# Patient Record
Sex: MALE | Race: WHITE | NOT HISPANIC OR LATINO
[De-identification: names, ages, dates, MRNs, and addresses within clinical notes are randomized per-mention and may not be internally consistent; named-entity substitution may affect disease eponyms.]

---

## 2018-11-08 PROBLEM — Z00.00 ENCOUNTER FOR PREVENTIVE HEALTH EXAMINATION: Status: ACTIVE | Noted: 2018-11-08

## 2018-11-19 ENCOUNTER — APPOINTMENT (OUTPATIENT)
Dept: SURGERY | Facility: CLINIC | Age: 53
End: 2018-11-19

## 2019-05-30 ENCOUNTER — NON-APPOINTMENT (OUTPATIENT)
Age: 54
End: 2019-05-30

## 2019-05-30 ENCOUNTER — APPOINTMENT (OUTPATIENT)
Dept: CARDIOLOGY | Facility: CLINIC | Age: 54
End: 2019-05-30
Payer: MEDICAID

## 2019-05-30 VITALS — SYSTOLIC BLOOD PRESSURE: 150 MMHG | DIASTOLIC BLOOD PRESSURE: 80 MMHG

## 2019-05-30 VITALS — BODY MASS INDEX: 49.44 KG/M2 | OXYGEN SATURATION: 97 % | WEIGHT: 315 LBS | HEART RATE: 108 BPM | HEIGHT: 67 IN

## 2019-05-30 DIAGNOSIS — Z87.891 PERSONAL HISTORY OF NICOTINE DEPENDENCE: ICD-10-CM

## 2019-05-30 DIAGNOSIS — Z82.49 FAMILY HISTORY OF ISCHEMIC HEART DISEASE AND OTHER DISEASES OF THE CIRCULATORY SYSTEM: ICD-10-CM

## 2019-05-30 PROCEDURE — 93000 ELECTROCARDIOGRAM COMPLETE: CPT

## 2019-05-30 PROCEDURE — 99205 OFFICE O/P NEW HI 60 MIN: CPT

## 2019-05-31 RX ORDER — FUROSEMIDE 40 MG/1
40 TABLET ORAL TWICE DAILY
Qty: 180 | Refills: 1 | Status: ACTIVE | COMMUNITY
Start: 2019-05-31

## 2019-05-31 RX ORDER — ALBUTEROL SULFATE 2.5 MG/3ML
(2.5 MG/3ML) SOLUTION RESPIRATORY (INHALATION)
Refills: 0 | Status: ACTIVE | COMMUNITY
Start: 2019-05-31

## 2019-05-31 RX ORDER — PANTOPRAZOLE 40 MG/1
40 TABLET, DELAYED RELEASE ORAL DAILY
Refills: 0 | Status: ACTIVE | COMMUNITY
Start: 2019-05-31

## 2019-05-31 RX ORDER — ESCITALOPRAM OXALATE 10 MG/1
10 TABLET, FILM COATED ORAL DAILY
Refills: 0 | Status: ACTIVE | COMMUNITY
Start: 2019-05-31

## 2019-05-31 RX ORDER — TRAZODONE HYDROCHLORIDE 50 MG/1
50 TABLET ORAL
Refills: 0 | Status: ACTIVE | COMMUNITY
Start: 2019-05-31

## 2019-05-31 RX ORDER — CLOPIDOGREL 75 MG/1
75 TABLET, FILM COATED ORAL DAILY
Qty: 90 | Refills: 1 | Status: ACTIVE | COMMUNITY
Start: 2019-05-31

## 2019-05-31 RX ORDER — SIMVASTATIN 40 MG/1
40 TABLET, FILM COATED ORAL
Qty: 90 | Refills: 1 | Status: ACTIVE | COMMUNITY
Start: 2019-05-31

## 2019-05-31 RX ORDER — METFORMIN HYDROCHLORIDE 500 MG/1
500 TABLET, COATED ORAL TWICE DAILY
Qty: 180 | Refills: 0 | Status: ACTIVE | COMMUNITY
Start: 2019-05-31

## 2019-05-31 RX ORDER — DIAZEPAM 10 MG/1
10 TABLET ORAL
Refills: 0 | Status: ACTIVE | COMMUNITY
Start: 2019-05-31

## 2019-06-03 ENCOUNTER — APPOINTMENT (OUTPATIENT)
Dept: CARDIOLOGY | Facility: CLINIC | Age: 54
End: 2019-06-03
Payer: MEDICAID

## 2019-06-03 PROCEDURE — 93306 TTE W/DOPPLER COMPLETE: CPT

## 2019-06-11 NOTE — DISCUSSION/SUMMARY
[Procedure High Risk] : the procedure risk is high [Patient Intermediate Risk] : the patient is an intermediate risk [As per surgery] : as per surgery [Continue] : Continue medications as currently directed [FreeTextEntry1] : He is hemodynamically stable   However I would like to get copies of his bypass surgery, cath reports and reports of stenting.  He should have an echocardiogram and a stress test for reasons stated above. He has had CABGx4 and 3 ( or is it 5 or 7??) stents. Initially he told me that he had 7 stents and later changed to 3 saying that he was joking!!. I am not so sure. I have scheduled him for those tests\par \par \par He had been referred to Dr. Doe for bariatric procedure but he says that he is not getting it done at this time.  I explained to him the pros and cons and I feel that it would be a very good idea for him to have it for long-term benefits. He said that eventually he intends to get it done.

## 2019-06-11 NOTE — PHYSICAL EXAM
[Normal Appearance] : normal appearance [Well Groomed] : well groomed [No Deformities] : no deformities [General Appearance - In No Acute Distress] : no acute distress [Normal Conjunctiva] : the conjunctiva exhibited no abnormalities [Eyelids - No Xanthelasma] : the eyelids demonstrated no xanthelasmas [Normal Oral Mucosa] : normal oral mucosa [No Oral Pallor] : no oral pallor [No Oral Cyanosis] : no oral cyanosis [Normal Jugular Venous A Waves Present] : normal jugular venous A waves present [Normal Jugular Venous V Waves Present] : normal jugular venous V waves present [No Jugular Venous Harry A Waves] : no jugular venous harry A waves [Respiration, Rhythm And Depth] : normal respiratory rhythm and effort [Exaggerated Use Of Accessory Muscles For Inspiration] : no accessory muscle use [Auscultation Breath Sounds / Voice Sounds] : lungs were clear to auscultation bilaterally [Heart Rate And Rhythm] : heart rate and rhythm were normal [Heart Sounds] : normal S1 and S2 [Murmurs] : no murmurs present [Abdomen Soft] : soft [Abdomen Tenderness] : non-tender [Abdomen Mass (___ Cm)] : no abdominal mass palpated [Abnormal Walk] : normal gait [Gait - Sufficient For Exercise Testing] : the gait was sufficient for exercise testing [Nail Clubbing] : no clubbing of the fingernails [Cyanosis, Localized] : no localized cyanosis [Petechial Hemorrhages (___cm)] : no petechial hemorrhages [Skin Color & Pigmentation] : normal skin color and pigmentation [] : no rash [No Venous Stasis] : no venous stasis [Skin Lesions] : no skin lesions [No Skin Ulcers] : no skin ulcer [No Xanthoma] : no  xanthoma was observed [Oriented To Time, Place, And Person] : oriented to person, place, and time [Affect] : the affect was normal [Mood] : the mood was normal [No Anxiety] : not feeling anxious [FreeTextEntry1] : see above

## 2019-06-11 NOTE — ASSESSMENT
[FreeTextEntry1] : Hemodynamically he is stable.  There are no signs of fluid overload or symptoms of ACS.  He has multiple comorbid conditions. Before he undergoes elective procedures he should get cardiac work up including echo to evaluate LV function and size and a stress test to r/o new significant CAD. He has a strong h/o of premature atherosclerosis. He is going to need it anyway prior to bariatric surgery. There are almost three weeks to get these tests done before the scheduled date for colonoscopy.

## 2019-06-11 NOTE — HISTORY OF PRESENT ILLNESS
[Preoperative Visit] : for a medical evaluation prior to surgery [Scheduled Procedure ___] : a [unfilled] [Date of Surgery ___] : on [unfilled] [Surgeon Name ___] : surgeon: [unfilled] [Good] : Good [Dyspnea] : dyspnea [Abdominal Pain] : abdominal pain [Poor Exercise Tolerance] : poor exercise tolerance [Diabetes] : diabetes [Cardiovascular Disease] : cardiovascular disease [Anti-Platelet Agents] : anti-platelet agents [GI Disease] : gastrointestinal disease [Frequent Aspirin Use] : frequent aspirin use [Prior Anesthesia] : Prior anesthesia [Prev Anesthesia Reaction] : previous anesthesia reaction [Electrocardiogram] : ~T an ECG ~C was performed [Cardiac Catheterization  (Diagnostic)] : cardiac catheterization ~T ~C was performed [Cardiovascular Stress Test] : a cardiac stress test ~T ~C was performed [Fever] : no fever [Chills] : no chills [Fatigue] : no fatigue [Chest Pain] : no chest pain [Cough] : no cough [Dysuria] : no dysuria [Urinary Frequency] : no urinary frequency [Nausea] : no nausea [Vomiting] : no vomiting [Diarrhea] : no diarrhea [Easy Bruising] : no easy bruising [Lower Extremity Swelling] : no lower extremity swelling [Pulmonary Disease] : no pulmonary disease [Nicotine Dependence] : no nicotine dependence [Alcohol Use] : no  alcohol use [Renal Disease] : no renal disease [Sleep Apnea] : no sleep apnea [Thromboembolic Problems] : no thromboembolic problems [Frequent use of NSAIDs] : no use of NSAIDs [Transfusion Reaction] : no transfusion reaction [Impaired Immunity] : no impaired immunity [Steroid Use in Last 6 Months] : no steroid use in the last six months [Anesthesia Reaction] : no anesthesia reaction [Sudden Death] : no sudden death [Clotting Disorder] : no clotting disorder [Bleeding Disorder] : no bleeding disorder [FreeTextEntry1] : 54-year-old gentleman was referred for cardiac evaluation and clearance for colonoscopy.  He has been having some stomach discomfort and colonoscopy was recommended for further evaluation.\par \par It feels like cramps in the stomach.  There is no heartburn.  He has no history of any exertional chest pain, palpitation, shortness of breath or dizziness.\par \par He used to be very active before.  About 2 years ago he lost 190 pounds.  He used to go to the gym on a regular basis.  He developed some problem with his foot 2 years ago which required surgery.  Since then he has gained about 95 pounds.  He is not physically active now.He now weighs about 325 pounds.

## 2019-06-19 ENCOUNTER — APPOINTMENT (OUTPATIENT)
Dept: CARDIOLOGY | Facility: CLINIC | Age: 54
End: 2019-06-19
Payer: MEDICAID

## 2019-06-19 PROCEDURE — 78452 HT MUSCLE IMAGE SPECT MULT: CPT | Mod: 26

## 2019-06-19 PROCEDURE — 93018 CV STRESS TEST I&R ONLY: CPT

## 2019-06-19 PROCEDURE — 93016 CV STRESS TEST SUPVJ ONLY: CPT

## 2019-06-24 ENCOUNTER — MOBILE ON CALL (OUTPATIENT)
Age: 54
End: 2019-06-24

## 2019-06-26 ENCOUNTER — MOBILE ON CALL (OUTPATIENT)
Age: 54
End: 2019-06-26

## 2019-07-02 ENCOUNTER — APPOINTMENT (OUTPATIENT)
Dept: CARDIOLOGY | Facility: CLINIC | Age: 54
End: 2019-07-02

## 2019-07-09 ENCOUNTER — OUTPATIENT (OUTPATIENT)
Dept: OUTPATIENT SERVICES | Facility: HOSPITAL | Age: 54
LOS: 1 days | End: 2019-07-09
Payer: MEDICAID

## 2019-07-09 VITALS
WEIGHT: 315 LBS | TEMPERATURE: 100 F | HEART RATE: 89 BPM | HEIGHT: 67 IN | RESPIRATION RATE: 18 BRPM | SYSTOLIC BLOOD PRESSURE: 136 MMHG | DIASTOLIC BLOOD PRESSURE: 90 MMHG

## 2019-07-09 DIAGNOSIS — Z01.818 ENCOUNTER FOR OTHER PREPROCEDURAL EXAMINATION: ICD-10-CM

## 2019-07-09 DIAGNOSIS — Z95.1 PRESENCE OF AORTOCORONARY BYPASS GRAFT: Chronic | ICD-10-CM

## 2019-07-09 DIAGNOSIS — R94.39 ABNORMAL RESULT OF OTHER CARDIOVASCULAR FUNCTION STUDY: ICD-10-CM

## 2019-07-09 LAB
ANION GAP SERPL CALC-SCNC: 18 MMOL/L — HIGH (ref 5–17)
BUN SERPL-MCNC: 7 MG/DL — SIGNIFICANT CHANGE UP (ref 7–23)
CALCIUM SERPL-MCNC: 9.8 MG/DL — SIGNIFICANT CHANGE UP (ref 8.4–10.5)
CHLORIDE SERPL-SCNC: 103 MMOL/L — SIGNIFICANT CHANGE UP (ref 96–108)
CO2 SERPL-SCNC: 22 MMOL/L — SIGNIFICANT CHANGE UP (ref 22–31)
CREAT SERPL-MCNC: 1.09 MG/DL — SIGNIFICANT CHANGE UP (ref 0.5–1.3)
GLUCOSE SERPL-MCNC: 110 MG/DL — HIGH (ref 70–99)
HCT VFR BLD CALC: 44.5 % — SIGNIFICANT CHANGE UP (ref 39–50)
HGB BLD-MCNC: 15.6 G/DL — SIGNIFICANT CHANGE UP (ref 13–17)
MCHC RBC-ENTMCNC: 28.2 PG — SIGNIFICANT CHANGE UP (ref 27–34)
MCHC RBC-ENTMCNC: 35.1 GM/DL — SIGNIFICANT CHANGE UP (ref 32–36)
MCV RBC AUTO: 80.1 FL — SIGNIFICANT CHANGE UP (ref 80–100)
PLATELET # BLD AUTO: 168 K/UL — SIGNIFICANT CHANGE UP (ref 150–400)
POTASSIUM SERPL-MCNC: 3.1 MMOL/L — LOW (ref 3.5–5.3)
POTASSIUM SERPL-MCNC: 3.9 MMOL/L — SIGNIFICANT CHANGE UP (ref 3.5–5.3)
POTASSIUM SERPL-SCNC: 3.1 MMOL/L — LOW (ref 3.5–5.3)
POTASSIUM SERPL-SCNC: 3.9 MMOL/L — SIGNIFICANT CHANGE UP (ref 3.5–5.3)
RBC # BLD: 5.55 M/UL — SIGNIFICANT CHANGE UP (ref 4.2–5.8)
RBC # FLD: 18.6 % — HIGH (ref 10.3–14.5)
SODIUM SERPL-SCNC: 143 MMOL/L — SIGNIFICANT CHANGE UP (ref 135–145)
WBC # BLD: 8 K/UL — SIGNIFICANT CHANGE UP (ref 3.8–10.5)
WBC # FLD AUTO: 8 K/UL — SIGNIFICANT CHANGE UP (ref 3.8–10.5)

## 2019-07-09 PROCEDURE — 85027 COMPLETE CBC AUTOMATED: CPT

## 2019-07-09 PROCEDURE — 92928 PRQ TCAT PLMT NTRAC ST 1 LES: CPT | Mod: LD,GC

## 2019-07-09 PROCEDURE — 93567 NJX CAR CTH SPRVLV AORTGRPHY: CPT | Mod: GC

## 2019-07-09 PROCEDURE — 93567 NJX CAR CTH SPRVLV AORTGRPHY: CPT

## 2019-07-09 PROCEDURE — 93010 ELECTROCARDIOGRAM REPORT: CPT

## 2019-07-09 PROCEDURE — C1894: CPT

## 2019-07-09 PROCEDURE — 93455 CORONARY ART/GRFT ANGIO S&I: CPT | Mod: 26,59,GC

## 2019-07-09 PROCEDURE — C1874: CPT

## 2019-07-09 PROCEDURE — C9600: CPT | Mod: LD

## 2019-07-09 PROCEDURE — C1760: CPT

## 2019-07-09 PROCEDURE — C1887: CPT

## 2019-07-09 PROCEDURE — 93455 CORONARY ART/GRFT ANGIO S&I: CPT | Mod: 59

## 2019-07-09 PROCEDURE — 93005 ELECTROCARDIOGRAM TRACING: CPT

## 2019-07-09 PROCEDURE — 84132 ASSAY OF SERUM POTASSIUM: CPT

## 2019-07-09 PROCEDURE — 80048 BASIC METABOLIC PNL TOTAL CA: CPT

## 2019-07-09 PROCEDURE — C1769: CPT

## 2019-07-09 RX ORDER — TRAZODONE HCL 50 MG
1 TABLET ORAL
Qty: 0 | Refills: 0 | DISCHARGE

## 2019-07-09 RX ORDER — LUBIPROSTONE 24 UG/1
1 CAPSULE, GELATIN COATED ORAL
Qty: 0 | Refills: 0 | DISCHARGE

## 2019-07-09 RX ORDER — FUROSEMIDE 40 MG
1 TABLET ORAL
Qty: 0 | Refills: 0 | DISCHARGE

## 2019-07-09 RX ORDER — METFORMIN HYDROCHLORIDE 850 MG/1
1 TABLET ORAL
Qty: 0 | Refills: 0 | DISCHARGE

## 2019-07-09 RX ORDER — CLOPIDOGREL BISULFATE 75 MG/1
1 TABLET, FILM COATED ORAL
Qty: 0 | Refills: 0 | DISCHARGE

## 2019-07-09 RX ORDER — ESCITALOPRAM OXALATE 10 MG/1
1 TABLET, FILM COATED ORAL
Qty: 0 | Refills: 0 | DISCHARGE

## 2019-07-09 RX ORDER — PANTOPRAZOLE SODIUM 20 MG/1
1 TABLET, DELAYED RELEASE ORAL
Qty: 0 | Refills: 0 | DISCHARGE

## 2019-07-09 RX ORDER — DIAZEPAM 5 MG
1 TABLET ORAL
Qty: 0 | Refills: 0 | DISCHARGE

## 2019-07-09 RX ORDER — POTASSIUM BICARBONATE 978 MG/1
25 TABLET, EFFERVESCENT ORAL ONCE
Refills: 0 | Status: COMPLETED | OUTPATIENT
Start: 2019-07-09 | End: 2019-07-09

## 2019-07-09 RX ORDER — POTASSIUM CHLORIDE 20 MEQ
40 PACKET (EA) ORAL ONCE
Refills: 0 | Status: COMPLETED | OUTPATIENT
Start: 2019-07-09 | End: 2019-07-09

## 2019-07-09 RX ORDER — SIMVASTATIN 20 MG/1
1 TABLET, FILM COATED ORAL
Qty: 0 | Refills: 0 | DISCHARGE

## 2019-07-09 RX ORDER — ASPIRIN/CALCIUM CARB/MAGNESIUM 324 MG
1 TABLET ORAL
Qty: 0 | Refills: 0 | DISCHARGE

## 2019-07-09 RX ADMIN — POTASSIUM BICARBONATE 25 MILLIEQUIVALENT(S): 978 TABLET, EFFERVESCENT ORAL at 13:17

## 2019-07-09 RX ADMIN — Medication 40 MILLIEQUIVALENT(S): at 13:18

## 2019-07-09 NOTE — H&P CARDIOLOGY - PMH
CAD (coronary artery disease)    HLD (hyperlipidemia)    HTN (hypertension)    Type 2 diabetes mellitus

## 2019-07-09 NOTE — H&P CARDIOLOGY - CONSTITUTIONAL
In the next few weeks you may receive a [employer] survey regarding your most recent clinic visit with us.  Please take a few moments out of your day to accurately evaluate your visit.  We strive to provide you with the best medical care.  Again, thank you for your time and we look forward to your next visit.  Well-Child Checkup: 14 to 18 Years  During the teen years, it’s important to keep having yearly checkups. Your teen may be embarrassed about having a checkup. Reassure your teen that the exam is normal and necessary. Be aware that the health care provider may ask to talk with your child without you in the exam room.     Stay involved in your teen’s life. Make sure your teen knows you’re always there when he or she needs to talk.     School and social issues  Here are some topics you, your teen, and the health care provider may want to discuss during this visit:  · School performance. How is your child doing in school? Is homework finished on time? Does your child stay organized? These are skills you can help with. Keep in mind that a drop in school performance can be a sign of other problems.  · Friendships. Do you like your child’s friends? Do the friendships seem healthy? Make sure to talk to your teen about who his or her friends are and how they spend time together. Peer pressure can be a problem among teenagers.  · Life at home. How is your child’s behavior? Does he or she get along with others in the family? Is he or she respectful of you, other adults, and authority? Does your child participate in family events, or does he or she withdraw from other family members?  · Risky behaviors. Many teenagers are curious about drugs, alcohol, smoking, and sex. Talk openly about these issues. Answer your child’s questions, and don’t be afraid to ask questions of your own. If you’re not sure how to approach these topics, talk to the health care provider for advice.   Puberty  Your teen may still be experiencing  some of the changes of puberty, such as:  · Acne and body odor. Hormones that increase during puberty can cause acne (pimples) on the face and body. Hormones can also increase sweating and cause a stronger body odor.  · Body changes. The body grows and matures during puberty. Hair will grow in the pubic area and on other parts of the body. Girls grow breasts and menstruate (have monthly periods). A boy’s voice changes, becoming lower and deeper. As the penis matures, erections and wet dreams will start to happen. Talk to your teen about what to expect, and help him or her deal with these changes when possible.  · Emotional changes. Along with these physical changes, you’ll likely notice changes in your teen’s personality. He or she may develop an interest in dating and becoming “more than friends” with other kids. Also, it’s normal for your teen to be guzman. Try to be patient and consistent. Encourage conversations, even when he or she doesn’t seem to want to talk. No matter how your teen acts, he or she still needs a parent.  Nutrition and exercise tips  Your teenager likely makes his or her own decisions about what to eat and how to spend free time. You can’t always have the final say, but you can encourage healthy habits. Your teen should:  · Get at least 30 minutes to 60 minutes of physical activity every day. This time can be broken up throughout the day. After-school sports, dance or martial arts classes, riding a bike, or even walking to school or a friend’s house counts as activity.    · Limit “screen time” to 1 hour to 2 hours each day. This includes time spent watching TV, playing video games, using the computer, and texting. If your teen has a TV, computer, or video game console in the bedroom, consider replacing it with a music player.   · Eat healthy. Your child should eat fruits, vegetables, lean meats, and whole grains every day. Less healthy foods--like French fries, candy, and chips--should be eaten  rarely. Some teens fall into the trap of snacking on junk food and fast food throughout the day. Make sure the kitchen is stocked with healthy options for after-school snacks. If your teen does choose to eat junk food, consider making him or her buy it with his or her own money.   · Eat 3 meals a day. Many kids skip breakfast and even lunch. Not only is this unhealthy, it can also hurt school performance. Make sure your teen eats breakfast. If your teen does not like the food served at school for lunch, allow him or her to prepare a bag lunch.  · Have at least one family meal with you each day. Busy schedules often limit time for sitting and talking. Sitting and eating together allows for family time. It also lets you see what and how your child eats.   · Limit soda and juice drinks. A small soda is OK once in a while. But soda, sports drinks, and juice drinks are no substitute for healthier drinks. Sports and juice drinks are no better. Water and low-fat or nonfat milk are the best choices.  Hygiene tips  · Teenagers should bathe or shower daily and use deodorant.  · Let the health care provider know if you or your teen have questions about hygiene or acne.  · Bring your teen to the dentist at least twice a year for teeth cleaning and a checkup.  · Remind your teen to brush and floss his or her teeth before bed.  Sleeping tips  During the teen years, sleep patterns may change. Many teenagers have a hard time falling asleep, which can lead to sleeping late the next morning. Here are some tips to help your teen get the rest he or she needs:  · Encourage your teen to keep a consistent bedtime, even on weekends. Sleeping is easier when the body follows a routine. Don’t let your teen stay up too late at night or sleep in too long in the morning.  · Help your teen wake up, if needed. Go into the bedroom, open the blinds, and get your teen out of bed -- even on weekends or during school vacations.  · Being active during  the day will help your child sleep better at night.  · Discourage use of the TV, computer, or video games for at least an hour before your teen goes to bed. (This is good advice for parents, too!)  · Make a rule that cell phones must be turned off at night.  Safety tips  · Set rules for how your teen can spend time outside of the house. Give your child a nighttime curfew. If your child has a cell phone, check in periodically by calling to ask where he or she is and what he or she is doing.  · Make sure cell phones and portable music players are used safely and responsibly. Help your teen understand that it is dangerous to talk on the phone, text, or listen to music with headphones while he or she is riding a bike or walking outdoors, especially when crossing the street.  · Constant loud music can cause hearing damage, so monitor your teen’s music volume. Many music players let you set a limit for how loud the volume can be turned up. Check the directions for details.  · When your teen is old enough for a ’s license, encourage safe driving. Teach your teen to always wear a seat belt, drive the speed limit, and follow the rules of the road. Do not allow your teenager to text or talk on a cell phone while driving. (And don’t do this yourself! Remember, you set an example.)  · Set rules and limits around driving and use of the car. If your teen gets a ticket or has an accident, there should be consequences. Driving is a privilege that can be taken away if your child doesn’t follow the rules.  · Teach your child to make good decisions about drugs, alcohol, sex, and other risky behaviors. Work together to come up with strategies for staying safe and dealing with peer pressure. Make sure your teenager knows he or she can always come to you for help.  Tests and vaccinations  If you have a strong family history of high cholesterol, your teen’s blood cholesterol may be tested at this visit. Based on recommendations from  the [facility], at this visit your child may receive the following vaccinations:  · Meningococcal  · Influenza (flu), annually  Recognizing signs of depression  It’s normal for teenagers to have extreme mood swings as a result of their changing hormones. It’s also just a part of growing up. But sometimes a teenager’s mood swings are signs of a larger problem. If your teen seems depressed for more than 2 weeks, you should be concerned. Signs of depression include:  · Use of drugs or alcohol  · Problems in school and at home  · Frequent episodes of running away  · Thoughts or talk of death or suicide  · Withdrawal from family and friends  · Sudden changes in eating or sleeping habits  · Sexual promiscuity or unplanned pregnancy  · Hostile behavior or rage  · Loss of pleasure in life  Depressed teens can be helped with treatment. Talk to your child’s health care provider. Or check with your local mental health center, social service agency, or hospital. Assure your teen that his or her pain can be eased. Offer your love and support. If your teen talks about death or suicide, seek help right away.      Next checkup at: _______________________________     PARENT NOTES:        © 4227-3907 [employer] [address] [place] [address], [place] [address]. All rights reserved. This information is not intended as a substitute for professional medical care. Always follow your healthcare professional's instructions.         Well-developed, well nourished

## 2019-07-09 NOTE — H&P CARDIOLOGY - HISTORY OF PRESENT ILLNESS
54 year old morbidly obese  male with pmhx of CAD  prior PCI ,  CABG x 4 (2003 at [facility]), type 2 DM , no history of implantable devices and HLD presents for left heart catheterization. The patient presented to his cardiologist, Dr. Melendez for cardiac clearance for an upcoming colonscopy.  He underwent a nuclear stress test which revealed the following inferolateral oliver infarct ischemia.  Of note the patient does not want to stay overnight regardless if he is PCI or not. He currently denies chest pain, syncope or dizziness.

## 2019-07-12 PROBLEM — I25.10 ATHEROSCLEROTIC HEART DISEASE OF NATIVE CORONARY ARTERY WITHOUT ANGINA PECTORIS: Chronic | Status: ACTIVE | Noted: 2019-07-09

## 2019-07-12 PROBLEM — E78.5 HYPERLIPIDEMIA, UNSPECIFIED: Chronic | Status: ACTIVE | Noted: 2019-07-09

## 2019-07-12 PROBLEM — I10 ESSENTIAL (PRIMARY) HYPERTENSION: Chronic | Status: ACTIVE | Noted: 2019-07-09

## 2019-07-12 PROBLEM — E11.9 TYPE 2 DIABETES MELLITUS WITHOUT COMPLICATIONS: Chronic | Status: ACTIVE | Noted: 2019-07-09

## 2019-07-17 ENCOUNTER — APPOINTMENT (OUTPATIENT)
Dept: CARDIOLOGY | Facility: CLINIC | Age: 54
End: 2019-07-17
Payer: MEDICAID

## 2019-07-17 ENCOUNTER — NON-APPOINTMENT (OUTPATIENT)
Age: 54
End: 2019-07-17

## 2019-07-17 VITALS — SYSTOLIC BLOOD PRESSURE: 130 MMHG | DIASTOLIC BLOOD PRESSURE: 80 MMHG

## 2019-07-17 DIAGNOSIS — Z01.818 ENCOUNTER FOR OTHER PREPROCEDURAL EXAMINATION: ICD-10-CM

## 2019-07-17 DIAGNOSIS — R94.39 ABNORMAL RESULT OF OTHER CARDIOVASCULAR FUNCTION STUDY: ICD-10-CM

## 2019-07-17 PROCEDURE — 93000 ELECTROCARDIOGRAM COMPLETE: CPT | Mod: 59

## 2019-07-17 PROCEDURE — 99496 TRANSJ CARE MGMT HIGH F2F 7D: CPT

## 2019-07-17 RX ORDER — CARVEDILOL 6.25 MG/1
6.25 TABLET, FILM COATED ORAL TWICE DAILY
Qty: 180 | Refills: 1 | Status: ACTIVE | COMMUNITY
Start: 2019-07-17 | End: 1900-01-01

## 2019-07-17 NOTE — DISCUSSION/SUMMARY
[FreeTextEntry1] : As he is doing well, I did not make any changes in his medications.  I told him to defer colonoscopy for the time being.Because of family dysfunction as well as VPCs I started him on carvedilol 6.25 mg twice a day.

## 2019-07-17 NOTE — ASSESSMENT
[FreeTextEntry1] : He is doing very well after his PCI.  Based on his symptoms it looks like there may already be some improvement of left ventricle systolic function.  I will repeat echo at the end of 3 months to assess.

## 2019-07-17 NOTE — HISTORY OF PRESENT ILLNESS
[FreeTextEntry1] : He feels much better after the PCI.  Yesterday he was able to walk 2 miles on a treadmill without any shortness of breath.  He has lost 10 pounds of weight since PCI.  Since January he has lost a total of 68 pounds.\par \par There is no chest pain.

## 2019-07-17 NOTE — REASON FOR VISIT
[Follow-Up - Clinic] : a clinic follow-up of [Coronary Artery Disease] : coronary artery disease [CABG Follow-up] : bypass graft [Prior Myocardial Infarction] : a prior myocardial infarction

## 2019-08-29 ENCOUNTER — APPOINTMENT (OUTPATIENT)
Dept: CARDIOLOGY | Facility: CLINIC | Age: 54
End: 2019-08-29
Payer: MEDICAID

## 2019-08-29 VITALS — SYSTOLIC BLOOD PRESSURE: 134 MMHG | DIASTOLIC BLOOD PRESSURE: 90 MMHG

## 2019-08-29 DIAGNOSIS — Z95.5 PRESENCE OF CORONARY ANGIOPLASTY IMPLANT AND GRAFT: ICD-10-CM

## 2019-08-29 PROCEDURE — 99214 OFFICE O/P EST MOD 30 MIN: CPT

## 2019-08-29 RX ORDER — LISINOPRIL 10 MG/1
10 TABLET ORAL DAILY
Qty: 90 | Refills: 1 | Status: ACTIVE | COMMUNITY
Start: 2019-08-29 | End: 1900-01-01

## 2019-08-29 NOTE — DISCUSSION/SUMMARY
[FreeTextEntry1] : I added lisinopril 10 mg po daily. F/U BP in 1 month. Defer endoscopy till Jan if possible. He hAS NOT taken furosemide for a while. I told him to start taking it regularly. It will help with his breathing as well.

## 2019-08-29 NOTE — PHYSICAL EXAM
[Well Groomed] : well groomed [Normal Appearance] : normal appearance [No Deformities] : no deformities [General Appearance - In No Acute Distress] : no acute distress [Eyelids - No Xanthelasma] : the eyelids demonstrated no xanthelasmas [Normal Conjunctiva] : the conjunctiva exhibited no abnormalities [Normal Oral Mucosa] : normal oral mucosa [No Oral Cyanosis] : no oral cyanosis [No Oral Pallor] : no oral pallor [Normal Jugular Venous A Waves Present] : normal jugular venous A waves present [Normal Jugular Venous V Waves Present] : normal jugular venous V waves present [No Jugular Venous Harry A Waves] : no jugular venous harry A waves [Respiration, Rhythm And Depth] : normal respiratory rhythm and effort [Exaggerated Use Of Accessory Muscles For Inspiration] : no accessory muscle use [Auscultation Breath Sounds / Voice Sounds] : lungs were clear to auscultation bilaterally [Heart Rate And Rhythm] : heart rate and rhythm were normal [Heart Sounds] : normal S1 and S2 [Murmurs] : no murmurs present [Abdomen Soft] : soft [Abdomen Tenderness] : non-tender [Abdomen Mass (___ Cm)] : no abdominal mass palpated [Abnormal Walk] : normal gait [Gait - Sufficient For Exercise Testing] : the gait was sufficient for exercise testing [Nail Clubbing] : no clubbing of the fingernails [Cyanosis, Localized] : no localized cyanosis [Petechial Hemorrhages (___cm)] : no petechial hemorrhages [Skin Color & Pigmentation] : normal skin color and pigmentation [] : no rash [No Venous Stasis] : no venous stasis [Skin Lesions] : no skin lesions [No Skin Ulcers] : no skin ulcer [No Xanthoma] : no  xanthoma was observed [FreeTextEntry1] : see above [Oriented To Time, Place, And Person] : oriented to person, place, and time [Affect] : the affect was normal [Mood] : the mood was normal [No Anxiety] : not feeling anxious

## 2019-08-29 NOTE — REASON FOR VISIT
[FreeTextEntry1] : There is no chest pain. Has abd discomfort and breathing pain in the stomach from reflux etc.

## 2019-10-17 ENCOUNTER — NON-APPOINTMENT (OUTPATIENT)
Age: 54
End: 2019-10-17

## 2019-10-17 ENCOUNTER — APPOINTMENT (OUTPATIENT)
Dept: CARDIOLOGY | Facility: CLINIC | Age: 54
End: 2019-10-17
Payer: MEDICAID

## 2019-10-17 VITALS — BODY MASS INDEX: 50.12 KG/M2 | OXYGEN SATURATION: 91 % | WEIGHT: 315 LBS | HEART RATE: 92 BPM

## 2019-10-17 VITALS — DIASTOLIC BLOOD PRESSURE: 80 MMHG | SYSTOLIC BLOOD PRESSURE: 146 MMHG

## 2019-10-17 DIAGNOSIS — E66.01 MORBID (SEVERE) OBESITY DUE TO EXCESS CALORIES: ICD-10-CM

## 2019-10-17 DIAGNOSIS — I51.9 HEART DISEASE, UNSPECIFIED: ICD-10-CM

## 2019-10-17 DIAGNOSIS — R93.1 ABNORMAL FINDINGS ON DIAGNOSTIC IMAGING OF HEART AND CORONARY CIRCULATION: ICD-10-CM

## 2019-10-17 DIAGNOSIS — Z95.1 PRESENCE OF AORTOCORONARY BYPASS GRAFT: ICD-10-CM

## 2019-10-17 DIAGNOSIS — Z95.5 PRESENCE OF CORONARY ANGIOPLASTY IMPLANT AND GRAFT: ICD-10-CM

## 2019-10-17 DIAGNOSIS — I25.2 OLD MYOCARDIAL INFARCTION: ICD-10-CM

## 2019-10-17 PROCEDURE — 93000 ELECTROCARDIOGRAM COMPLETE: CPT

## 2019-10-17 PROCEDURE — 99214 OFFICE O/P EST MOD 30 MIN: CPT

## 2019-10-17 NOTE — HISTORY OF PRESENT ILLNESS
[FreeTextEntry1] : He denies any chest pain palpitation or dizziness.  He gets short of breath after walking one block.  He denies orthopnea.It has been over 3 months since he had LAD stent.  It is time to look for recovery of LV function.

## 2019-10-17 NOTE — DISCUSSION/SUMMARY
[FreeTextEntry1] : I did not make any changes in his medications.  I told him to watch his salt and try to lose weight.  Echocardiogram will be done to evaluate recovery of LV function.  If his blood pressure continues to be in the present range during his next visit then I will increase his medications.  He is very anxious and nervous at this time.

## 2019-10-17 NOTE — PHYSICAL EXAM
[Normal Appearance] : normal appearance [No Deformities] : no deformities [Well Groomed] : well groomed [General Appearance - In No Acute Distress] : no acute distress [Normal Conjunctiva] : the conjunctiva exhibited no abnormalities [Normal Oral Mucosa] : normal oral mucosa [No Oral Pallor] : no oral pallor [Eyelids - No Xanthelasma] : the eyelids demonstrated no xanthelasmas [Normal Jugular Venous A Waves Present] : normal jugular venous A waves present [Normal Jugular Venous V Waves Present] : normal jugular venous V waves present [No Oral Cyanosis] : no oral cyanosis [No Jugular Venous Harry A Waves] : no jugular venous harry A waves [Respiration, Rhythm And Depth] : normal respiratory rhythm and effort [Exaggerated Use Of Accessory Muscles For Inspiration] : no accessory muscle use [Heart Sounds] : normal S1 and S2 [Auscultation Breath Sounds / Voice Sounds] : lungs were clear to auscultation bilaterally [Heart Rate And Rhythm] : heart rate and rhythm were normal [Murmurs] : no murmurs present [Abdomen Tenderness] : non-tender [Abdomen Soft] : soft [Abdomen Mass (___ Cm)] : no abdominal mass palpated [Gait - Sufficient For Exercise Testing] : the gait was sufficient for exercise testing [Abnormal Walk] : normal gait [Petechial Hemorrhages (___cm)] : no petechial hemorrhages [Nail Clubbing] : no clubbing of the fingernails [Cyanosis, Localized] : no localized cyanosis [Skin Color & Pigmentation] : normal skin color and pigmentation [Skin Lesions] : no skin lesions [No Venous Stasis] : no venous stasis [] : no rash [FreeTextEntry1] : see above [No Skin Ulcers] : no skin ulcer [No Xanthoma] : no  xanthoma was observed [Oriented To Time, Place, And Person] : oriented to person, place, and time [Affect] : the affect was normal [Mood] : the mood was normal [No Anxiety] : not feeling anxious

## 2019-10-17 NOTE — REASON FOR VISIT
[Follow-Up - Clinic] : a clinic follow-up of [Coronary Artery Disease] : coronary artery disease [CABG Follow-up] : bypass graft [Hyperlipidemia] : hyperlipidemia [Hypertension] : hypertension [FreeTextEntry1] : PCI, CM, Obesity

## 2019-10-17 NOTE — ASSESSMENT
[FreeTextEntry1] : Hemodynamically stable.  His systolic blood pressure is slightly high.  There are no symptoms of ACS.

## 2019-10-23 PROBLEM — I51.9 SYSTOLIC DYSFUNCTION: Status: ACTIVE | Noted: 2019-10-23

## 2019-12-17 ENCOUNTER — APPOINTMENT (OUTPATIENT)
Dept: CARDIOLOGY | Facility: CLINIC | Age: 54
End: 2019-12-17